# Patient Record
Sex: FEMALE | Race: BLACK OR AFRICAN AMERICAN | NOT HISPANIC OR LATINO
[De-identification: names, ages, dates, MRNs, and addresses within clinical notes are randomized per-mention and may not be internally consistent; named-entity substitution may affect disease eponyms.]

---

## 2020-09-29 ENCOUNTER — ERX REFILL RESPONSE (OUTPATIENT)
Age: 55
End: 2020-09-29

## 2020-09-29 RX ORDER — HYOSCYAMINE SULFATE 0.12 MG/1
PLACE 1 TABLET UNDER TONGUE EVERY 6 HOURS AS NEEDED FOR ABDOMINAL PAIN TABLET ORAL; SUBLINGUAL
Qty: 90 | Refills: 0

## 2020-11-05 ENCOUNTER — OFFICE VISIT (OUTPATIENT)
Dept: URBAN - METROPOLITAN AREA CLINIC 105 | Facility: CLINIC | Age: 55
End: 2020-11-05
Payer: COMMERCIAL

## 2020-11-05 DIAGNOSIS — K58.0 IRRITABLE BOWEL SYNDROME WITH DIARRHEA: ICD-10-CM

## 2020-11-05 DIAGNOSIS — R05 CHRONIC COUGH: ICD-10-CM

## 2020-11-05 DIAGNOSIS — R06.6 HICCUPS: ICD-10-CM

## 2020-11-05 DIAGNOSIS — R49.0 HOARSENESS OF VOICE: ICD-10-CM

## 2020-11-05 PROCEDURE — 3017F COLORECTAL CA SCREEN DOC REV: CPT | Performed by: INTERNAL MEDICINE

## 2020-11-05 PROCEDURE — G8482 FLU IMMUNIZE ORDER/ADMIN: HCPCS | Performed by: INTERNAL MEDICINE

## 2020-11-05 PROCEDURE — G8420 CALC BMI NORM PARAMETERS: HCPCS | Performed by: INTERNAL MEDICINE

## 2020-11-05 PROCEDURE — G8427 DOCREV CUR MEDS BY ELIG CLIN: HCPCS | Performed by: INTERNAL MEDICINE

## 2020-11-05 PROCEDURE — G9903 PT SCRN TBCO ID AS NON USER: HCPCS | Performed by: INTERNAL MEDICINE

## 2020-11-05 PROCEDURE — 99214 OFFICE O/P EST MOD 30 MIN: CPT | Performed by: INTERNAL MEDICINE

## 2020-11-05 RX ORDER — DICLOFENAC SODIUM 50 MG/1
TAKE 1 TABLET BY ORAL ROUTE AS NEEDED TABLET, DELAYED RELEASE ORAL
Qty: 0 | Refills: 0 | Status: ACTIVE | COMMUNITY
Start: 1900-01-01

## 2020-11-05 RX ORDER — HYOSCYAMINE SULFATE 0.12 MG/1
PLACE 1 TABLET UNDER TONGUE EVERY 6 HOURS AS NEEDED FOR ABDOMINAL PAIN TABLET ORAL; SUBLINGUAL
Qty: 90 | Refills: 0 | Status: ACTIVE | COMMUNITY

## 2020-11-05 RX ORDER — AMLODIPINE AND OLMESARTAN MEDOXOMIL 5; 20 MG/1; MG/1
TAKE 1 TABLET BY ORAL ROUTE ONCE DAILY TABLET, FILM COATED ORAL 1
Qty: 0 | Refills: 0 | Status: ACTIVE | COMMUNITY
Start: 1900-01-01

## 2020-11-05 RX ORDER — OMEPRAZOLE 40 MG/1
AS DIRECTED CAPSULE, DELAYED RELEASE ORAL
Status: ACTIVE | COMMUNITY

## 2020-11-05 RX ORDER — METHOCARBAMOL 500 MG/1
TAKE 2 TABLETS (1,000 MG) BY ORAL ROUTE 4 TIMES PER DAY TABLET, FILM COATED ORAL
Qty: 0 | Refills: 0 | Status: ACTIVE | COMMUNITY
Start: 1900-01-01

## 2020-11-05 RX ORDER — GABAPENTIN 300 MG/1
TAKE 1 CAPSULE BY ORAL ROUTE AS NEEDED CAPSULE ORAL
Qty: 0 | Refills: 0 | Status: ACTIVE | COMMUNITY
Start: 1900-01-01

## 2020-11-05 NOTE — HPI-TODAY'S VISIT:
The patient is an -American/Black female who presents on follow-up for IBS.  On 11/3/16, the patient also reported gas and bloating. She was still on VSL #3 probiotic, which had helped her symptoms. She tried to modify her diet and particulary avoided barbecue, spicy foods, and pepper flakes. She also currently taking hyoscyamine 0.125 mg every 6hrs as needed and VSL#3 for IBS, which was helping to control related symptoms.   On 9/18/19, the patient had been taking hyoscyamine 0.125 mg SL Q6H PRN which provided relief of intermittent IBS symptoms. She wanted a refill. She said VSL#3 was now unavailable for her to get. She wanted to start taking Florastor as an alternative. She noted occasional hiccup episodes. She said these episodes spanned 5 minutes and were occasional over the past few weeks. She noted occasional bloating. She took Gas-x PRN. She watched her diet.  Today, she says she takes hyoscyamine 0.125 mg Q6H PRN 1-2x/week with a benefit. She alternates between diarrhea and constipation. She reports a history of constipation starting from when she was in [facility] with the [employer]. She was diagnosed with IBS in 2006. She has 1-2 BMs/QD-QOD. When she has 2 in a day, that's when she has an explosive diarrheal episode (3x/month). She noted GERD a month and a half ago. She has had chronic cough for years and is losing her voice. She has been on omeprazole 40 mg QD for a month. She notes minimal improvement with her cough. She doesn't know the benefit with her voice. Her ENT told her she has silent reflux. A laryngoscopy was done shortly after starting the PPI and she has a follow-up in 2 months. She is making diet/lifestyle changes.   She doesn't drink coffee, alcohol, or soda. She previously drank orange juice every morning. She used to have dinner between 6-8 pm, but now it's at 7. Bed is at 11. She doesn't snack in between.

## 2021-01-12 ENCOUNTER — ERX REFILL RESPONSE (OUTPATIENT)
Age: 56
End: 2021-01-12

## 2021-01-12 RX ORDER — HYOSCYAMINE SULFATE 0.12 MG/1
PLACE 1 TABLET UNDER TONGUE EVERY 6 HOURS AS NEEDED FOR ABDOMINAL PAIN TABLET ORAL; SUBLINGUAL
Qty: 90 | Refills: 1

## 2021-10-05 ENCOUNTER — DASHBOARD ENCOUNTERS (OUTPATIENT)
Age: 56
End: 2021-10-05

## 2021-10-05 ENCOUNTER — OFFICE VISIT (OUTPATIENT)
Dept: URBAN - METROPOLITAN AREA CLINIC 105 | Facility: CLINIC | Age: 56
End: 2021-10-05
Payer: COMMERCIAL

## 2021-10-05 ENCOUNTER — WEB ENCOUNTER (OUTPATIENT)
Dept: URBAN - METROPOLITAN AREA CLINIC 105 | Facility: CLINIC | Age: 56
End: 2021-10-05

## 2021-10-05 DIAGNOSIS — R05.8 DRY COUGH: ICD-10-CM

## 2021-10-05 DIAGNOSIS — R63.4 ABNORMAL INTENTIONAL WEIGHT LOSS: ICD-10-CM

## 2021-10-05 DIAGNOSIS — K58.0 IRRITABLE BOWEL SYNDROME WITH DIARRHEA: ICD-10-CM

## 2021-10-05 DIAGNOSIS — R49.0 CHRONIC AM HOARSENESS: ICD-10-CM

## 2021-10-05 PROCEDURE — 99215 OFFICE O/P EST HI 40 MIN: CPT | Performed by: INTERNAL MEDICINE

## 2021-10-05 RX ORDER — ERGOCALCIFEROL 1.25 MG/1
TAKE 1 CAPSULE BY MOUTH ONE TIME PER WEEK CAPSULE, LIQUID FILLED ORAL
Qty: 4 | Refills: 0 | Status: ACTIVE | COMMUNITY

## 2021-10-05 RX ORDER — METHOCARBAMOL 500 MG/1
TAKE 2 TABLETS (1,000 MG) BY ORAL ROUTE 4 TIMES PER DAY TABLET, FILM COATED ORAL
Qty: 0 | Refills: 0 | Status: ACTIVE | COMMUNITY
Start: 1900-01-01

## 2021-10-05 RX ORDER — PREDNISONE 10 MG/1
TABLET ORAL
Qty: 90 | Status: ACTIVE | COMMUNITY

## 2021-10-05 RX ORDER — GABAPENTIN 300 MG/1
TAKE 1 CAPSULE BY ORAL ROUTE AS NEEDED CAPSULE ORAL
Qty: 0 | Refills: 0 | Status: ACTIVE | COMMUNITY
Start: 1900-01-01

## 2021-10-05 RX ORDER — HYOSCYAMINE SULFATE 0.12 MG/1
PLACE 1 TABLET UNDER TONGUE EVERY 6 HOURS AS NEEDED FOR ABDOMINAL PAIN TABLET ORAL; SUBLINGUAL
Qty: 90 | Refills: 1 | Status: ACTIVE | COMMUNITY

## 2021-10-05 RX ORDER — OMEPRAZOLE 40 MG/1
AS DIRECTED CAPSULE, DELAYED RELEASE ORAL
Status: ACTIVE | COMMUNITY

## 2021-10-05 RX ORDER — DICLOFENAC SODIUM 50 MG/1
TAKE 1 TABLET BY ORAL ROUTE AS NEEDED TABLET, DELAYED RELEASE ORAL
Qty: 0 | Refills: 0 | Status: ACTIVE | COMMUNITY
Start: 1900-01-01

## 2021-10-05 RX ORDER — AMLODIPINE AND OLMESARTAN MEDOXOMIL 5; 20 MG/1; MG/1
TAKE 1 TABLET BY ORAL ROUTE ONCE DAILY TABLET, FILM COATED ORAL 1
Qty: 0 | Refills: 0 | Status: ACTIVE | COMMUNITY
Start: 1900-01-01

## 2021-10-05 NOTE — HPI-TODAY'S VISIT:
The patient is an -American/Black female who presents on follow-up for IBS.  On 11/3/16, the patient also reported gas and bloating. She was still on VSL #3 probiotic, which had helped her symptoms. She tried to modify her diet and particulary avoided barbecue, spicy foods, and pepper flakes. She also currently taking hyoscyamine 0.125 mg every 6hrs as needed and VSL#3 for IBS, which was helping to control related symptoms.   On 9/18/19, the patient had been taking hyoscyamine 0.125 mg SL Q6H PRN which provided relief of intermittent IBS symptoms. She wanted a refill. She said VSL#3 was now unavailable for her to get. She wanted to start taking Florastor as an alternative. She noted occasional hiccup episodes. She said these episodes spanned 5 minutes and were occasional over the past few weeks. She noted occasional bloating. She took Gas-x PRN. She watched her diet.  On 11/5/20, she said she took hyoscyamine 0.125 mg Q6H PRN 1-2x/week with a benefit. She alternated between diarrhea and constipation. She reported a history of constipation starting from when she was in [facility] with the . She was diagnosed with IBS in 2006. She had 1-2 BMs/QD-QOD. When she had 2 in a day, that was when she had an explosive diarrheal episode (3x/month). She noted GERD a month and a half ago. She had had chronic cough for years and was losing her voice. She had been on omeprazole 40 mg QD for a month. She noted minimal improvement with her cough. She did not know the benefit with her voice. Her ENT told her she had silent reflux. A laryngoscopy was done shortly after starting the PPI and she had a follow-up in 2 months. She was making diet/lifestyle changes.   She did not drink coffee, alcohol, or soda. She previously drank orange juice every morning. She used to have dinner between 6-8 pm, but now it was at 7. Bed was at 11. She did not snack in between.  11/5/20 she was going to trial Miralax 1/2 cap QD with a goal to have a daily BM with good evacuation to avoid explosive stools.  Today, she states she takes Miralax 1/2 cap QD PRN and will only take it daily for 3 days.  She takes hyoscyamine 0.125 mg 1-2x/week and Gas-X PRN.  She notes today she is seeng me today after following with Dr. Crowley, ENT for cough.  She has a history of cough and hoarseness which she believes today is better on omeprazole 40 mg QD.  She notes she has been trying dose reduction taking it QOD, because she has been changing her diet ("trying natural things") to help with GERD.  She has been tapering omeprazole in June/July and has been off omeprazole since the beginning of August 2021.  She states today that she has been loosing weight and she has been attributing the weight loss to taking omeprazole.   She states before taking omeprazole she weighed 165-166 lbs .  She states her weight got to as low as 128/129 lbs in Jan. 2021 and today is up to 135.4 lbs.    Review of her previous records note the following weights recorded on PE: 3/24/17 156 lbs, 10/30/19 164 lbs, 1/24/20 160.6 lbs, 9/21/20 160 lbs, 11/5/21 152.8 lbs (4 weeks after starting omeprazole), 6/25/21 135 lbs, and today 135.4 lbs (off omeprazole for 2 months).  She notes altering her diet - stopped citrus.  She states her activity level is the same.    At the end of the visit she asks me about filling out a [employer] form which at first she stated was for disability, but later clarified it was to obtain service connection in regard to her IBS which she states began during a tour in [facility].

## 2021-10-06 ENCOUNTER — OFFICE VISIT (OUTPATIENT)
Dept: URBAN - METROPOLITAN AREA CLINIC 105 | Facility: CLINIC | Age: 56
End: 2021-10-06

## 2021-10-07 PROBLEM — 197125005 IRRITABLE BOWEL SYNDROME WITH DIARRHEA: Status: ACTIVE | Noted: 2020-11-05

## 2022-02-24 ENCOUNTER — ERX REFILL RESPONSE (OUTPATIENT)
Dept: URBAN - METROPOLITAN AREA CLINIC 105 | Facility: CLINIC | Age: 57
End: 2022-02-24

## 2022-02-24 RX ORDER — HYOSCYAMINE SULFATE 0.12 MG/1
PLACE 1 TABLET UNDER TONGUE EVERY 6 HOURS AS NEEDED FOR ABDOMINAL PAIN TABLET ORAL; SUBLINGUAL
Qty: 90 | Refills: 1 | OUTPATIENT

## 2022-02-24 RX ORDER — HYOSCYAMINE SULFATE 0.12 MG/1
PLACE 1 TABLET UNDER TONGUE EVERY 6 HOURS AS NEEDED FOR ABDOMINAL PAIN TABLET ORAL; SUBLINGUAL
Qty: 90 TABLET | Refills: 3 | OUTPATIENT

## 2023-03-13 ENCOUNTER — ERX REFILL RESPONSE (OUTPATIENT)
Dept: URBAN - METROPOLITAN AREA CLINIC 105 | Facility: CLINIC | Age: 58
End: 2023-03-13

## 2023-03-13 RX ORDER — HYOSCYAMINE SULFATE 0.12 MG/1
PLACE 1 TABLET UNDER TONGUE EVERY 6 HOURS AS NEEDED FOR ABDOMINAL PAIN TABLET ORAL; SUBLINGUAL
Qty: 90 | Refills: 3 | OUTPATIENT

## 2023-03-13 RX ORDER — HYOSCYAMINE SULFATE 0.12 MG/1
PLACE 1 TABLET UNDER TONGUE EVERY 6 HOURS AS NEEDED FOR ABDOMINAL PAIN TABLET ORAL; SUBLINGUAL
Qty: 90 TABLET | Refills: 3 | OUTPATIENT

## 2023-03-28 ENCOUNTER — ERX REFILL RESPONSE (OUTPATIENT)
Dept: URBAN - METROPOLITAN AREA CLINIC 105 | Facility: CLINIC | Age: 58
End: 2023-03-28

## 2023-03-28 RX ORDER — HYOSCYAMINE SULFATE 0.12 MG/1
PLACE 1 TABLET UNDER TONGUE EVERY 6 HOURS AS NEEDED FOR ABDOMINAL PAIN TABLET ORAL; SUBLINGUAL
Qty: 90 | Refills: 3 | OUTPATIENT

## 2023-03-28 RX ORDER — HYOSCYAMINE SULFATE 0.12 MG/1
1 TABLET UNDER THE TONGUE AND ALLOW TO DISSOLVE AS NEEDED TABLET ORAL; SUBLINGUAL
Qty: 90 | Refills: 5 | OUTPATIENT

## 2024-12-20 ENCOUNTER — ERX REFILL RESPONSE (OUTPATIENT)
Dept: URBAN - METROPOLITAN AREA CLINIC 105 | Facility: CLINIC | Age: 59
End: 2024-12-20

## 2024-12-20 RX ORDER — HYOSCYAMINE SULFATE 0.12 MG/1
1 TABLET TABLET ORAL; SUBLINGUAL
Qty: 30 | Refills: 0 | OUTPATIENT

## 2024-12-20 RX ORDER — HYOSCYAMINE SULFATE 0.12 MG/1
1 TABLET UNDER THE TONGUE AND ALLOW TO DISSOLVE AS NEEDED TABLET ORAL; SUBLINGUAL
Qty: 90 | Refills: 5 | OUTPATIENT